# Patient Record
(demographics unavailable — no encounter records)

---

## 2024-10-10 NOTE — HEALTH RISK ASSESSMENT
[de-identified] : neurologist for meningioma [Change in mental status noted] : No change in mental status noted [Language] : denies difficulty with language [Behavior] : denies difficulty with behavior [Learning/Retaining New Information] : denies difficulty learning/retaining new information [Handling Complex Tasks] : denies difficulty handling complex tasks [Reasoning] : denies difficulty with reasoning [Spatial Ability and Orientation] : denies difficulty with spatial ability and orientation [High Risk Behavior] : no high risk behavior [Reports changes in hearing] : Reports no changes in hearing [Reports changes in vision] : Reports no changes in vision [Reports changes in dental health] : Reports no changes in dental health [Guns at Home] : no guns at home [Safety elements used in home] : no safety elements used in home [Travel to Developing Areas] : does not  travel to developing areas [TB Exposure] : is not being exposed to tuberculosis [Caregiver Concerns] : does not have caregiver concerns [ColonoscopyDate] : 2020 [ColonoscopyComments] : normal [de-identified] : optho once yearly [Yes] : Yes [Monthly or less (1 pt)] : Monthly or less (1 point) [1 or 2 (0 pts)] : 1 or 2 (0 points) [Never (0 pts)] : Never (0 points) [No] : In the past 12 months have you used drugs other than those required for medical reasons? No [No falls in past year] : Patient reported no falls in the past year [0] : 2) Feeling down, depressed, or hopeless: Not at all (0) [PHQ-2 Negative - No further assessment needed] : PHQ-2 Negative - No further assessment needed [Patient/Caregiver not ready to engage] : , patient/caregiver not ready to engage [I will adhere to the patient's wishes.] : I will adhere to the patient's wishes. [Time Spent: ___ minutes] : Time Spent: [unfilled] minutes [Audit-CScore] : 1 [de-identified] : Average [de-identified] : Average [facility] [[id number]] : 0 [AdvancecareDate] : 04/22 [Never] : Never

## 2024-10-10 NOTE — HEALTH RISK ASSESSMENT
[de-identified] : neurologist for meningioma [Change in mental status noted] : No change in mental status noted [Language] : denies difficulty with language [Behavior] : denies difficulty with behavior [Learning/Retaining New Information] : denies difficulty learning/retaining new information [Handling Complex Tasks] : denies difficulty handling complex tasks [Reasoning] : denies difficulty with reasoning [Spatial Ability and Orientation] : denies difficulty with spatial ability and orientation [High Risk Behavior] : no high risk behavior [Reports changes in hearing] : Reports no changes in hearing [Reports changes in vision] : Reports no changes in vision [Reports changes in dental health] : Reports no changes in dental health [Guns at Home] : no guns at home [Safety elements used in home] : no safety elements used in home [Travel to Developing Areas] : does not  travel to developing areas [TB Exposure] : is not being exposed to tuberculosis [Caregiver Concerns] : does not have caregiver concerns [ColonoscopyDate] : 2020 [ColonoscopyComments] : normal [de-identified] : optho once yearly [Yes] : Yes [Monthly or less (1 pt)] : Monthly or less (1 point) [1 or 2 (0 pts)] : 1 or 2 (0 points) [Never (0 pts)] : Never (0 points) [No] : In the past 12 months have you used drugs other than those required for medical reasons? No [No falls in past year] : Patient reported no falls in the past year [0] : 2) Feeling down, depressed, or hopeless: Not at all (0) [PHQ-2 Negative - No further assessment needed] : PHQ-2 Negative - No further assessment needed [Patient/Caregiver not ready to engage] : , patient/caregiver not ready to engage [I will adhere to the patient's wishes.] : I will adhere to the patient's wishes. [Time Spent: ___ minutes] : Time Spent: [unfilled] minutes [Audit-CScore] : 1 [de-identified] : Average [de-identified] : Average [facility]] : 9 [[id number]] : 0 [AdvancecareDate] : 04/22 [Never] : Never

## 2024-10-10 NOTE — REVIEW OF SYSTEMS
[Fever] : no fever [Chills] : no chills [Hot Flashes] : no hot flashes [Night Sweats] : no night sweats [Recent Change In Weight] : ~T no recent weight change [Discharge] : no discharge [Pain] : no pain [Itching] : no itching [Earache] : no earache [Hearing Loss] : no hearing loss [Nosebleeds] : no nosebleeds [Palpitations] : no palpitations [Abdominal Pain] : no abdominal pain [Nausea] : no nausea [Diarrhea] : no diarrhea [Vomiting] : no vomiting [Dysuria] : no dysuria [Hematuria] : no hematuria [Itching] : no itching [Skin Rash] : no skin rash [Headache] : no headache [Dizziness] : no dizziness [Fainting] : no fainting [FreeTextEntry9] : upper/middle back pain  [Patient Intake Form Reviewed] : Patient intake form was reviewed [Fatigue] : fatigue [Postnasal Drip] : postnasal drip [Sore Throat] : sore throat [Chest Pain] : chest pain [Cough] : cough [Dyspnea on Exertion] : dyspnea on exertion [Back Pain] : back pain [Insomnia] : insomnia [Negative] : Heme/Lymph [Nasal Discharge] : no nasal discharge [Shortness Of Breath] : no shortness of breath [Wheezing] : no wheezing [FreeTextEntry2] : Overweight [FreeTextEntry4] : Sore throat, postnasal drip [FreeTextEntry5] : HTN, HLD [FreeTextEntry6] : productive cough [FreeTextEntry8] : ED [FreeTextEntry7] : GERD [FreeTextEntry1] : Low Vit. D

## 2024-10-10 NOTE — PHYSICAL EXAM
[de-identified] : B/L crackles heard on auscultation [No Acute Distress] : no acute distress [Well Nourished] : well nourished [Well Developed] : well developed [Well-Appearing] : well-appearing [Normal Sclera/Conjunctiva] : normal sclera/conjunctiva [PERRL] : pupils equal round and reactive to light [EOMI] : extraocular movements intact [Normal Outer Ear/Nose] : the outer ears and nose were normal in appearance [No JVD] : no jugular venous distention [No Lymphadenopathy] : no lymphadenopathy [Supple] : supple [Thyroid Normal, No Nodules] : the thyroid was normal and there were no nodules present [No Respiratory Distress] : no respiratory distress  [No Accessory Muscle Use] : no accessory muscle use [Clear to Auscultation] : lungs were clear to auscultation bilaterally [Normal Rate] : normal rate  [Regular Rhythm] : with a regular rhythm [Normal S1, S2] : normal S1 and S2 [No Murmur] : no murmur heard [No Carotid Bruits] : no carotid bruits [No Abdominal Bruit] : a ~M bruit was not heard ~T in the abdomen [No Varicosities] : no varicosities [Pedal Pulses Present] : the pedal pulses are present [No Edema] : there was no peripheral edema [No Palpable Aorta] : no palpable aorta [No Extremity Clubbing/Cyanosis] : no extremity clubbing/cyanosis [Soft] : abdomen soft [Non Tender] : non-tender [Non-distended] : non-distended [No Masses] : no abdominal mass palpated [No HSM] : no HSM [Normal Bowel Sounds] : normal bowel sounds [Normal Posterior Cervical Nodes] : no posterior cervical lymphadenopathy [Normal Anterior Cervical Nodes] : no anterior cervical lymphadenopathy [No CVA Tenderness] : no CVA  tenderness [No Spinal Tenderness] : no spinal tenderness [No Joint Swelling] : no joint swelling [Grossly Normal Strength/Tone] : grossly normal strength/tone [No Rash] : no rash [Coordination Grossly Intact] : coordination grossly intact [No Focal Deficits] : no focal deficits [Normal Gait] : normal gait [Deep Tendon Reflexes (DTR)] : deep tendon reflexes were 2+ and symmetric [Normal Affect] : the affect was normal [Normal Insight/Judgement] : insight and judgment were intact [de-identified] : Overweight [de-identified] : Erythematous pharynx

## 2024-10-10 NOTE — PLAN
[FreeTextEntry1] : 66 YOM with PMHx of HTN, HLD, hyperglycemia presents for a follow up. no other complaints. follows up with cardiologist   Care plan reviewed Labs drawn Meds reviewed

## 2024-10-10 NOTE — PHYSICAL EXAM
[de-identified] : B/L crackles heard on auscultation [No Acute Distress] : no acute distress [Well Nourished] : well nourished [Well Developed] : well developed [Well-Appearing] : well-appearing [Normal Sclera/Conjunctiva] : normal sclera/conjunctiva [PERRL] : pupils equal round and reactive to light [EOMI] : extraocular movements intact [Normal Outer Ear/Nose] : the outer ears and nose were normal in appearance [No JVD] : no jugular venous distention [No Lymphadenopathy] : no lymphadenopathy [Supple] : supple [Thyroid Normal, No Nodules] : the thyroid was normal and there were no nodules present [No Respiratory Distress] : no respiratory distress  [No Accessory Muscle Use] : no accessory muscle use [Clear to Auscultation] : lungs were clear to auscultation bilaterally [Normal Rate] : normal rate  [Regular Rhythm] : with a regular rhythm [Normal S1, S2] : normal S1 and S2 [No Murmur] : no murmur heard [No Carotid Bruits] : no carotid bruits [No Abdominal Bruit] : a ~M bruit was not heard ~T in the abdomen [No Varicosities] : no varicosities [Pedal Pulses Present] : the pedal pulses are present [No Edema] : there was no peripheral edema [No Palpable Aorta] : no palpable aorta [No Extremity Clubbing/Cyanosis] : no extremity clubbing/cyanosis [Soft] : abdomen soft [Non Tender] : non-tender [Non-distended] : non-distended [No Masses] : no abdominal mass palpated [No HSM] : no HSM [Normal Bowel Sounds] : normal bowel sounds [Normal Posterior Cervical Nodes] : no posterior cervical lymphadenopathy [Normal Anterior Cervical Nodes] : no anterior cervical lymphadenopathy [No CVA Tenderness] : no CVA  tenderness [No Spinal Tenderness] : no spinal tenderness [No Joint Swelling] : no joint swelling [Grossly Normal Strength/Tone] : grossly normal strength/tone [No Rash] : no rash [Coordination Grossly Intact] : coordination grossly intact [No Focal Deficits] : no focal deficits [Normal Gait] : normal gait [Deep Tendon Reflexes (DTR)] : deep tendon reflexes were 2+ and symmetric [Normal Affect] : the affect was normal [Normal Insight/Judgement] : insight and judgment were intact [de-identified] : Overweight [de-identified] : Erythematous pharynx

## 2024-10-10 NOTE — REVIEW OF SYSTEMS
[Fever] : no fever [Chills] : no chills [Hot Flashes] : no hot flashes [Night Sweats] : no night sweats [Recent Change In Weight] : ~T no recent weight change [Discharge] : no discharge [Pain] : no pain [Itching] : no itching [Earache] : no earache [Hearing Loss] : no hearing loss [Nosebleeds] : no nosebleeds [Palpitations] : no palpitations [Abdominal Pain] : no abdominal pain [Nausea] : no nausea [Diarrhea] : no diarrhea [Vomiting] : no vomiting [Dysuria] : no dysuria [Hematuria] : no hematuria [Itching] : no itching [Skin Rash] : no skin rash [Headache] : no headache [Dizziness] : no dizziness [Fainting] : no fainting [FreeTextEntry9] : upper/middle back pain  [Patient Intake Form Reviewed] : Patient intake form was reviewed [Fatigue] : fatigue [Postnasal Drip] : postnasal drip [Sore Throat] : sore throat [Chest Pain] : chest pain [Cough] : cough [Dyspnea on Exertion] : dyspnea on exertion [Back Pain] : back pain [Insomnia] : insomnia [Negative] : Heme/Lymph [Nasal Discharge] : no nasal discharge [Shortness Of Breath] : no shortness of breath [Wheezing] : no wheezing [FreeTextEntry2] : Overweight [FreeTextEntry4] : Sore throat, postnasal drip [FreeTextEntry5] : HTN, HLD [FreeTextEntry6] : productive cough [FreeTextEntry7] : GERD [FreeTextEntry8] : ED [FreeTextEntry1] : Low Vit. D
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
## 2024-11-04 NOTE — ASSESSMENT
[FreeTextEntry1] : Carotid duplex: February 2021: Mild atherosclerosis seen bilaterally without any significant stenosis.  Pharmacological nuclear stress test: February 8, 2021: Normal study  Echocardiogram March 7, 2022: LVEF 50 to 55%.  Mild concentric LVH.  Mild MR.  Mildly dilated left atrium and right atrium.   [facility] 4/19/2022 CORONARY VESSELS: The coronary circulation is left dominant. LM:   --  LM: Normal. LAD:   --  LAD: The vessel was large sized. Angiography showed mild atherosclerosis with no flow limiting lesions. CX:   --  Circumflex: Normal. RCA:   --  RCA: The vessel was small (non-dominant). --  Mid RCA: There was a 30 % stenosis. COMPLICATIONS: No complications occurred during the cath lab visit. DIAGNOSTIC IMPRESSIONS: Non obstructive CAD Mildly elevated left ventricular filling pressure ( LVEDP 17 mmHg) DIAGNOSTIC RECOMMENDATIONS: continue medical therapy for CAD including ASA 81 mg and statin to lower LDL < 70 mg/dl Prepared and signed by Jeremias Lutz MD Signed 04/19/2022 15:55:10    EKG 5/13/2024- Sinus Rhythm  -Nonspecific T-abnormality.  ABNORMAL  EKG 8/5/20224- Sinus Rhythm -With rate variation  cv = 10. Low voltage in precordial leads. -Nonspecific QRS widening.  Echocardiogram September 9, 2024: LVEF 50 to 55%.  Mildly enlarged right ventricle with normal RV systolic function.  No pericardial effusion.  No significant valvular heart disease Holter monitor August 2024: Normal sinus rhythm  rare PACs and PVCs.  No VT no PAF Pharmacological nuclear stress test September 9, 2024: Normal myocardial perfusion. EKG 11/4/2024- Sinus Rhythm -occasional ectopic ventricular beat   -Nonspecific QRS widening.BORDERLINE  Assessment: 1.  CAD -nonobstructive CAD based on cardiac catheterization on April 19, 2022.  Patient is on appropriate GDMT medical therapy  ..will give Rx for SL NTG for PRN use. 2.  Hypertension/hyperlipidemia- On Atorvastatin and Zetia 3.  Bilateral carotid stenosis- No significant stenosis by recent carotid duplex 4. Obesity, back pain and other problems. 5. Palpitations  Recommendations:  1.  Patient advised to continue current medical therapy/SL NTG Rx sent 2.  .  Follow-up in 6  months

## 2024-11-04 NOTE — PHYSICAL EXAM
[Well Developed] : well developed [Well Nourished] : well nourished [Obese] : obese [Normal] : no rash, no skin lesions [de-identified] : Dry oral mucosa

## 2025-04-03 NOTE — HEALTH RISK ASSESSMENT
[Good] : ~his/her~  mood as  good [Monthly or less (1 pt)] : Monthly or less (1 point) [1 or 2 (0 pts)] : 1 or 2 (0 points) [No] : In the past 12 months have you used drugs other than those required for medical reasons? No [No falls in past year] : Patient reported no falls in the past year [0] : 2) Feeling down, depressed, or hopeless: Not at all (0) [PHQ-2 Negative - No further assessment needed] : PHQ-2 Negative - No further assessment needed [Never] : Never [0-4] : 0-4 [Patient declined Low Dose CT Scan] : Patient declined Low Dose CT Scan [Patient declined Retinal Exam] : Patient declined Retinal Exam. [HIV test declined] : HIV test declined [Hepatitis C test declined] : Hepatitis C test declined [None] : None [With Significant Other] : lives with significant other [With Family] : lives with family [# of Members in Household ___] :  household currently consist of [unfilled] member(s) [Retired] : retired [High School] : high school [] :  [# Of Children ___] : has [unfilled] children [Sexually Active] : sexually active [Feels Safe at Home] : Feels safe at home [Fully functional (bathing, dressing, toileting, transferring, walking, feeding)] : Fully functional (bathing, dressing, toileting, transferring, walking, feeding) [Fully functional (using the telephone, shopping, preparing meals, housekeeping, doing laundry, using] : Fully functional and needs no help or supervision to perform IADLs (using the telephone, shopping, preparing meals, housekeeping, doing laundry, using transportation, managing medications and managing finances) [Reports normal functional visual acuity (ie: able to read med bottle)] : Reports normal functional visual acuity [Smoke Detector] : smoke detector [Carbon Monoxide Detector] : carbon monoxide detector [Seat Belt] :  uses seat belt [Sunscreen] : uses sunscreen [Patient/Caregiver not ready to engage] : , patient/caregiver not ready to engage [I will adhere to the patient's wishes.] : I will adhere to the patient's wishes. [Time Spent: ___ minutes] : Time Spent: [unfilled] minutes [Never (0 pts)] : Never (0 points) [Patient reported colonoscopy was normal] : Patient reported colonoscopy was normal [de-identified] : neurologist for meningioma, cardiology, GI [Audit-CScore] : 0 [de-identified] : not very active [de-identified] : not the healthiest [[facility]] : 10 [GYQ5Eakec] : 0 [Change in mental status noted] : No change in mental status noted [Language] : denies difficulty with language [Behavior] : denies difficulty with behavior [Learning/Retaining New Information] : denies difficulty learning/retaining new information [Handling Complex Tasks] : denies difficulty handling complex tasks [Reasoning] : denies difficulty with reasoning [Spatial Ability and Orientation] : denies difficulty with spatial ability and orientation [High Risk Behavior] : no high risk behavior [Reports changes in hearing] : Reports no changes in hearing [Reports changes in vision] : Reports no changes in vision [Reports changes in dental health] : Reports no changes in dental health [Safety elements used in home] : no safety elements used in home [Travel to Developing Areas] : does not  travel to developing areas [TB Exposure] : is not being exposed to tuberculosis [Caregiver Concerns] : does not have caregiver concerns [ColonoscopyDate] : 2024 [ColonoscopyComments] : normal [FreeTextEntry2] : retired  [de-identified] : optho once yearly

## 2025-04-03 NOTE — HISTORY OF PRESENT ILLNESS
[FreeTextEntry1] : 68 y/o male with a PMHx of HTN, HLD, T2DM, ED, CAD, TIA, and anemia presents for a CPE. [de-identified] : 68 y/o male with a PMHx of HTN, HLD, T2DM, ED, CAD, TIA, and anemia presents for a CPE. Pt c/o left ankle discomfort and left plantar foot pain for several months. Pt is requesting a podiatry referral. Denies any injuries, denies numbness and tingling. Pt also c/o chronic intermittent back pain. Pt states that he follows with cardiology, neurology, and gastroenterology. He denies recent illnesses, hospitalizations, or trauma. Pt denies fever, chills, headache, chest pain, SOB, or N/V/D. Pt states he is noncompliant with diet, and pt states he is not very active but just joined the gym and is planning on starting to exercise.

## 2025-04-03 NOTE — COUNSELING
[Fall prevention counseling provided] : Fall prevention counseling provided [Adequate lighting] : Adequate lighting [No throw rugs] : No throw rugs [Use proper foot wear] : Use proper foot wear [Behavioral health counseling provided] : Behavioral health counseling provided [Sleep ___ hours/day] : Sleep [unfilled] hours/day [Engage in a relaxing activity] : Engage in a relaxing activity [AUDIT-C Screening administered and reviewed] : AUDIT-C Screening administered and reviewed [Potential consequences of obesity discussed] : Potential consequences of obesity discussed [Encouraged to increase physical activity] : Encouraged to increase physical activity [None] : None [Good understanding] : Patient has a good understanding of lifestyle changes and steps needed to achieve self management goal [FreeTextEntry2] : Non-smoker

## 2025-04-03 NOTE — PLAN
[FreeTextEntry1] : 68 y/o male with a PMHx of HTN, HLD, T2DM, ED, CAD, TIA, and anemia presents for a CPE. Pt c/o left ankle discomfort and left plantar foot pain for several months. Pt is requesting a podiatry referral. Denies any injuries, denies numbness and tingling. Pt also c/o chronic intermittent back pain. Pt states that he follows with cardiology, neurology, and gastroenterology. He denies recent illnesses, hospitalizations, or trauma. Pt denies fever, chills, headache, chest pain, SOB, or N/V/D. Pt states he is noncompliant with diet, and pt states he is not very active but just joined the gym and is planning on starting to exercise.   Care plan reviewed Labs drawn POCT A1C: 6.9% Pt educated on importance of healthy diet and exercise Pt educated on risks and complications of poor glycemic control. Meds reviewed; Started on Jardiance 10 mg  Podiatry referral EKG reviewed RTC in 6 weeks

## 2025-04-03 NOTE — HISTORY OF PRESENT ILLNESS
[FreeTextEntry1] : 68 y/o male with a PMHx of HTN, HLD, T2DM, ED, CAD, TIA, and anemia presents for a CPE. [de-identified] : 68 y/o male with a PMHx of HTN, HLD, T2DM, ED, CAD, TIA, and anemia presents for a CPE. Pt c/o left ankle discomfort and left plantar foot pain for several months. Pt is requesting a podiatry referral. Denies any injuries, denies numbness and tingling. Pt also c/o chronic intermittent back pain. Pt states that he follows with cardiology, neurology, and gastroenterology. He denies recent illnesses, hospitalizations, or trauma. Pt denies fever, chills, headache, chest pain, SOB, or N/V/D. Pt states he is noncompliant with diet, and pt states he is not very active but just joined the gym and is planning on starting to exercise.

## 2025-04-03 NOTE — PLAN
[FreeTextEntry1] : 70 y/o male with a PMHx of HTN, HLD, T2DM, ED, CAD, TIA, and anemia presents for a CPE. Pt c/o left ankle discomfort and left plantar foot pain for several months. Pt is requesting a podiatry referral. Denies any injuries, denies numbness and tingling. Pt also c/o chronic intermittent back pain. Pt states that he follows with cardiology, neurology, and gastroenterology. He denies recent illnesses, hospitalizations, or trauma. Pt denies fever, chills, headache, chest pain, SOB, or N/V/D. Pt states he is noncompliant with diet, and pt states he is not very active but just joined the gym and is planning on starting to exercise.   Care plan reviewed Labs drawn POCT A1C: 6.9% Pt educated on importance of healthy diet and exercise Pt educated on risks and complications of poor glycemic control. Meds reviewed; Started on Jardiance 10 mg  Podiatry referral EKG reviewed RTC in 6 weeks

## 2025-04-03 NOTE — HEALTH RISK ASSESSMENT
[Good] : ~his/her~  mood as  good [Monthly or less (1 pt)] : Monthly or less (1 point) [1 or 2 (0 pts)] : 1 or 2 (0 points) [No] : In the past 12 months have you used drugs other than those required for medical reasons? No [No falls in past year] : Patient reported no falls in the past year [0] : 2) Feeling down, depressed, or hopeless: Not at all (0) [PHQ-2 Negative - No further assessment needed] : PHQ-2 Negative - No further assessment needed [Never] : Never [0-4] : 0-4 [Patient declined Low Dose CT Scan] : Patient declined Low Dose CT Scan [Patient declined Retinal Exam] : Patient declined Retinal Exam. [HIV test declined] : HIV test declined [Hepatitis C test declined] : Hepatitis C test declined [None] : None [With Significant Other] : lives with significant other [With Family] : lives with family [# of Members in Household ___] :  household currently consist of [unfilled] member(s) [Retired] : retired [High School] : high school [] :  [# Of Children ___] : has [unfilled] children [Sexually Active] : sexually active [Feels Safe at Home] : Feels safe at home [Fully functional (bathing, dressing, toileting, transferring, walking, feeding)] : Fully functional (bathing, dressing, toileting, transferring, walking, feeding) [Fully functional (using the telephone, shopping, preparing meals, housekeeping, doing laundry, using] : Fully functional and needs no help or supervision to perform IADLs (using the telephone, shopping, preparing meals, housekeeping, doing laundry, using transportation, managing medications and managing finances) [Reports normal functional visual acuity (ie: able to read med bottle)] : Reports normal functional visual acuity [Smoke Detector] : smoke detector [Carbon Monoxide Detector] : carbon monoxide detector [Seat Belt] :  uses seat belt [Sunscreen] : uses sunscreen [Patient/Caregiver not ready to engage] : , patient/caregiver not ready to engage [I will adhere to the patient's wishes.] : I will adhere to the patient's wishes. [Time Spent: ___ minutes] : Time Spent: [unfilled] minutes [Never (0 pts)] : Never (0 points) [Patient reported colonoscopy was normal] : Patient reported colonoscopy was normal [de-identified] : neurologist for meningioma, cardiology, GI [Audit-CScore] : 0 [de-identified] : not very active [de-identified] : not the healthiest [[facility]] : 10 [[id number]] : 0 [Change in mental status noted] : No change in mental status noted [Language] : denies difficulty with language [Behavior] : denies difficulty with behavior [Learning/Retaining New Information] : denies difficulty learning/retaining new information [Handling Complex Tasks] : denies difficulty handling complex tasks [Reasoning] : denies difficulty with reasoning [Spatial Ability and Orientation] : denies difficulty with spatial ability and orientation [High Risk Behavior] : no high risk behavior [Reports changes in hearing] : Reports no changes in hearing [Reports changes in vision] : Reports no changes in vision [Reports changes in dental health] : Reports no changes in dental health [Safety elements used in home] : no safety elements used in home [Travel to Developing Areas] : does not  travel to developing areas [TB Exposure] : is not being exposed to tuberculosis [Caregiver Concerns] : does not have caregiver concerns [ColonoscopyDate] : 2024 [ColonoscopyComments] : normal [FreeTextEntry2] : retired  [de-identified] : optho once yearly

## 2025-04-03 NOTE — REVIEW OF SYSTEMS
[Patient Intake Form Reviewed] : Patient intake form was reviewed [Joint Pain] : no joint pain [Joint Stiffness] : no joint stiffness [Muscle Pain] : no muscle pain [Muscle Weakness] : no muscle weakness [Joint Swelling] : no joint swelling [Back Pain] : back pain [Fever] : no fever [Chills] : no chills [Fatigue] : no fatigue [Hot Flashes] : no hot flashes [Night Sweats] : no night sweats [Recent Change In Weight] : ~T no recent weight change [Discharge] : no discharge [Pain] : no pain [Redness] : no redness [Dryness] : no dryness [Vision Problems] : no vision problems [Itching] : no itching [Earache] : no earache [Hearing Loss] : no hearing loss [Nosebleeds] : no nosebleeds [Postnasal Drip] : no postnasal drip [Nasal Discharge] : no nasal discharge [Sore Throat] : no sore throat [Hoarseness] : no hoarseness [Chest Pain] : no chest pain [Palpitations] : no palpitations [Shortness Of Breath] : no shortness of breath [Wheezing] : no wheezing [Cough] : no cough [Dyspnea on Exertion] : not dyspnea on exertion [Abdominal Pain] : no abdominal pain [Nausea] : no nausea [Constipation] : no constipation [Diarrhea] : no diarrhea [Vomiting] : no vomiting [Heartburn] : no heartburn [Melena] : no melena [Dysuria] : no dysuria [Incontinence] : no incontinence [Hesitancy] : no hesitancy [Nocturia] : no nocturia [Hematuria] : no hematuria [Frequency] : no frequency [Mole Changes] : no mole changes [Nail Changes] : no nail changes [Hair Changes] : no hair changes [Skin Rash] : no skin rash [Headache] : no headache [Dizziness] : no dizziness [Fainting] : no fainting [Suicidal] : not suicidal [Insomnia] : no insomnia [Anxiety] : no anxiety [Depression] : no depression [Easy Bleeding] : no easy bleeding [Easy Bruising] : no easy bruising [Swollen Glands] : no swollen glands [FreeTextEntry9] : left ankle and left plantar foot pain

## 2025-05-02 NOTE — CONSULT LETTER
[Dear  ___] : Dear  [unfilled], [Courtesy Letter:] : I had the pleasure of seeing your patient, [unfilled], in my office today. [Please see my note below.] : Please see my note below. [Consult Closing:] : Thank you very much for allowing me to participate in the care of this patient.  If you have any questions, please do not hesitate to contact me. [Sincerely,] : Sincerely, [FreeTextEntry3] : Oscar Nolan M.D., Ph.D. DPN-N [facility] [place] Director, Division of Neurology Director, [facility]

## 2025-05-02 NOTE — PHYSICAL EXAM
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
## 2025-05-02 NOTE — DATA REVIEWED
[de-identified] : His last follow-up MRI was March 21, 2024.  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

## 2025-05-02 NOTE — ASSESSMENT
[FreeTextEntry1] : This is a 69-year-old man with history of meningioma.  He had an MRI a little over a year ago it was stable.  He has no symptoms.  I will check an MRI again to ensure stability.  I will see him back in the office in 1 year but call him with results of the MRI once available.

## 2025-05-12 NOTE — ASSESSMENT
[FreeTextEntry1] : Carotid duplex: February 2021: Mild atherosclerosis seen bilaterally without any significant stenosis.  Pharmacological nuclear stress test: February 8, 2021: Normal study  Echocardiogram March 7, 2022: LVEF 50 to 55%.  Mild concentric LVH.  Mild MR.  Mildly dilated left atrium and right atrium.   [facility] 4/19/2022 CORONARY VESSELS: The coronary circulation is left dominant. LM:   --  LM: Normal. LAD:   --  LAD: The vessel was large sized. Angiography showed mild atherosclerosis with no flow limiting lesions. CX:   --  Circumflex: Normal. RCA:   --  RCA: The vessel was small (non-dominant). --  Mid RCA: There was a 30 % stenosis. COMPLICATIONS: No complications occurred during the cath lab visit. DIAGNOSTIC IMPRESSIONS: Non obstructive CAD Mildly elevated left ventricular filling pressure ( LVEDP 17 mmHg) DIAGNOSTIC RECOMMENDATIONS: continue medical therapy for CAD including ASA 81 mg and statin to lower LDL < 70 mg/dl Prepared and signed by Jeremias Lutz MD Signed 04/19/2022 15:55:10  Echocardiogram September 9, 2024: LVEF 50 to 55%.  Mildly enlarged right ventricle with normal RV systolic function.  No pericardial effusion.  No significant valvular heart disease  Holter monitor August 2024: Normal sinus rhythm  rare PACs and PVCs.  No VT no PAF Pharmacological nuclear stress test September 9, 2024: Normal myocardial perfusion.  EKG 11/4/2024- Sinus Rhythm -occasional ectopic ventricular beat   -Nonspecific QRS widening.BORDERLINE  EKG 5/12/2025- Sinus Rhythm  -Nonspecific QRS widening. Unchanged compared to prior EKG. Assessment: 1.  CAD -nonobstructive CAD based on cardiac catheterization on April 19, 2022.  Patient is on appropriate GDMT medical therapy  ..will give Rx for SL NTG for PRN use. 2.  Hypertension/hyperlipidemia- On Atorvastatin and Zetia 3.  Bilateral carotid stenosis- No significant stenosis by recent carotid duplex 4. Obesity, back pain and other problems. 5. Preop for EGD/Colonoscopy  Recommendations:  1.  Patient advised to continue current medical therapy/SL NTG Rx sent 2.  .  Follow-up in 6  months  Patient is low risk for perioperative cardiac events from EGD and Colonoscopy. NO ABSOLUTE CONTRAINDICATIONS TO PROCEED WITH EGD AND COLONOSCOPY.

## 2025-05-12 NOTE — PHYSICAL EXAM
[Well Developed] : well developed [Well Nourished] : well nourished [Obese] : obese [Normal] : no rash, no skin lesions [de-identified] : Dry oral mucosa
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
## 2025-05-14 NOTE — ASSESSMENT
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 The area to be injected was wiped thoroughly with sterile alcohol. Then using a combination of 1.0 cc of Celestone acetate suspension combined with 1 cc of Dexamethasone sodium phosphate and 1.0 cc of Lidocaine plain, an injection was given at the left suarez neuroma. Sterile gauze was used for compression and then a sterile Band-Aid was applied to the injection site. The possibility of a flair reaction was discussed with the patient prior to administering the injection Discussed likely additional etiology of back given history of multiple back surgeries if not improving advised to follow up with them as well I applied and strapped his foot with metatarsal pads. Instructed on use Can incorporate into orthotic if helps PTR 2-3 weeks if symptomatic

## 2025-05-14 NOTE — PHYSICAL EXAM
[General Appearance - Alert] : alert [General Appearance - In No Acute Distress] : in no acute distress [2+] : left foot dorsalis pedis 2+ [] : normal strength/tone [de-identified] : Full unrestricted nonpainfuil range of motion right ankle no ankle instability noted  left 3rd interspace + nupur click with reproducible tenderness to area.  no pain on range of motion MTPJ no pain submetatarsal 2-5.  [Vibration Dec.] : diminished vibratory sensation at the level of the toes [Oriented To Time, Place, And Person] : oriented to person, place, and time [Impaired Insight] : insight and judgment were intact
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
## 2025-06-11 NOTE — COUNSELING
[Adequate lighting] : Adequate lighting [Fall prevention counseling provided] : Fall prevention counseling provided [Use proper foot wear] : Use proper foot wear [No throw rugs] : No throw rugs [Behavioral health counseling provided] : Behavioral health counseling provided [Sleep ___ hours/day] : Sleep [unfilled] hours/day [Engage in a relaxing activity] : Engage in a relaxing activity [AUDIT-C Screening administered and reviewed] : AUDIT-C Screening administered and reviewed [Potential consequences of obesity discussed] : Potential consequences of obesity discussed [Encouraged to increase physical activity] : Encouraged to increase physical activity [None] : None [Good understanding] : Patient has a good understanding of lifestyle changes and steps needed to achieve self management goal [FreeTextEntry2] : Non-smoker

## 2025-06-11 NOTE — PHYSICAL EXAM
[No Acute Distress] : no acute distress [Well Nourished] : well nourished [Well Developed] : well developed [Well-Appearing] : well-appearing [Normal Sclera/Conjunctiva] : normal sclera/conjunctiva [PERRL] : pupils equal round and reactive to light [EOMI] : extraocular movements intact [Normal Outer Ear/Nose] : the outer ears and nose were normal in appearance [Normal Oropharynx] : the oropharynx was normal [No JVD] : no jugular venous distention [No Lymphadenopathy] : no lymphadenopathy [Supple] : supple [Thyroid Normal, No Nodules] : the thyroid was normal and there were no nodules present [No Respiratory Distress] : no respiratory distress  [No Accessory Muscle Use] : no accessory muscle use [Clear to Auscultation] : lungs were clear to auscultation bilaterally [Regular Rhythm] : with a regular rhythm [Normal Rate] : normal rate  [Normal S1, S2] : normal S1 and S2 [No Murmur] : no murmur heard [No Abdominal Bruit] : a ~M bruit was not heard ~T in the abdomen [No Carotid Bruits] : no carotid bruits [No Varicosities] : no varicosities [Pedal Pulses Present] : the pedal pulses are present [No Edema] : there was no peripheral edema [No Palpable Aorta] : no palpable aorta [No Extremity Clubbing/Cyanosis] : no extremity clubbing/cyanosis [Soft] : abdomen soft [Non Tender] : non-tender [Non-distended] : non-distended [No Masses] : no abdominal mass palpated [No HSM] : no HSM [Normal Bowel Sounds] : normal bowel sounds [Normal Posterior Cervical Nodes] : no posterior cervical lymphadenopathy [Normal Anterior Cervical Nodes] : no anterior cervical lymphadenopathy [No CVA Tenderness] : no CVA  tenderness [No Spinal Tenderness] : no spinal tenderness [No Joint Swelling] : no joint swelling [Grossly Normal Strength/Tone] : grossly normal strength/tone [No Rash] : no rash [Coordination Grossly Intact] : coordination grossly intact [No Focal Deficits] : no focal deficits [Normal Gait] : normal gait [Deep Tendon Reflexes (DTR)] : deep tendon reflexes were 2+ and symmetric [Normal Affect] : the affect was normal [Normal] : affect was normal and insight and judgment were intact [Normal Insight/Judgement] : insight and judgment were intact

## 2025-06-11 NOTE — HEALTH RISK ASSESSMENT
[Good] : ~his/her~  mood as  good [1 or 2 (0 pts)] : 1 or 2 (0 points) [No] : In the past 12 months have you used drugs other than those required for medical reasons? No [Never (0 pts)] : Never (0 points) [No falls in past year] : Patient reported no falls in the past year [PHQ-2 Negative - No further assessment needed] : PHQ-2 Negative - No further assessment needed [0] : 2) Feeling down, depressed, or hopeless: Not at all (0) [Patient/Caregiver not ready to engage] : , patient/caregiver not ready to engage [I will adhere to the patient's wishes.] : I will adhere to the patient's wishes. [Time Spent: ___ minutes] : Time Spent: [unfilled] minutes [Never] : Never [Patient declined Low Dose CT Scan] : Patient declined Low Dose CT Scan [Patient declined Retinal Exam] : Patient declined Retinal Exam. [Patient reported colonoscopy was normal] : Patient reported colonoscopy was normal [HIV test declined] : HIV test declined [Hepatitis C test declined] : Hepatitis C test declined [None] : None [With Significant Other] : lives with significant other [With Family] : lives with family [# of Members in Household ___] :  household currently consist of [unfilled] member(s) [Retired] : retired [High School] : high school [] :  [Sexually Active] : sexually active [# Of Children ___] : has [unfilled] children [Fully functional (bathing, dressing, toileting, transferring, walking, feeding)] : Fully functional (bathing, dressing, toileting, transferring, walking, feeding) [Feels Safe at Home] : Feels safe at home [Fully functional (using the telephone, shopping, preparing meals, housekeeping, doing laundry, using] : Fully functional and needs no help or supervision to perform IADLs (using the telephone, shopping, preparing meals, housekeeping, doing laundry, using transportation, managing medications and managing finances) [Reports normal functional visual acuity (ie: able to read med bottle)] : Reports normal functional visual acuity [Carbon Monoxide Detector] : carbon monoxide detector [Smoke Detector] : smoke detector [Seat Belt] :  uses seat belt [Sunscreen] : uses sunscreen [de-identified] : neurologist for meningioma, cardiology, GI [Audit-CScore] : 0 [de-identified] : not the healthiest [de-identified] : not very active [[facility]] : 10 [[id number]] : 0 [Change in mental status noted] : No change in mental status noted [Language] : denies difficulty with language [Behavior] : denies difficulty with behavior [Learning/Retaining New Information] : denies difficulty learning/retaining new information [Handling Complex Tasks] : denies difficulty handling complex tasks [Reasoning] : denies difficulty with reasoning [Spatial Ability and Orientation] : denies difficulty with spatial ability and orientation [High Risk Behavior] : no high risk behavior [Reports changes in hearing] : Reports no changes in hearing [Reports changes in vision] : Reports no changes in vision [Reports changes in dental health] : Reports no changes in dental health [Safety elements used in home] : no safety elements used in home [Travel to Developing Areas] : does not  travel to developing areas [TB Exposure] : is not being exposed to tuberculosis [Caregiver Concerns] : does not have caregiver concerns [ColonoscopyDate] : 2024 [ColonoscopyComments] : normal [FreeTextEntry2] : retired  [de-identified] : optho once yearly

## 2025-06-11 NOTE — HISTORY OF PRESENT ILLNESS
[FreeTextEntry1] : 68 y/o male with a PMHx of HTN, HLD, T2DM, ED, CAD, TIA, and anemia presents for 6 week follow up. [de-identified] : 70 y/o male with a PMHx of HTN, HLD, T2DM, ED, CAD, TIA, and anemia presents for 6 week follow up. Pt with recent visit to office for left ankle discomfort and left plantar foot pain and followed up with podiatry. Xray showed no evidence of fracture/ dislocation/ acute injury. 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 Pt given a steroid shot and given metatarsal pads. Pt also with complaints of upset stomach and diarrhea starting x3 days ago. Pt states abd pain improves once he has a BM. Pt believes he has food poison or stomach bug. Pt has been keeping himself hydrated. Pt was started on Jardiance but due to cost has not been taking it. Pt has been sticking to metformin and remains compliant. Pt states that he follows with cardiology, neurology, and gastroenterology. He denies recent illnesses, hospitalizations, or trauma. Pt denies fever, chills, headache, chest pain, SOB, or N/V/D. Pt states he is noncompliant with diet, and pt states he is not very active but just joined the gym and has gone once since last visit.

## 2025-06-11 NOTE — PLAN
Concurrent stay review; Secondary Review Determination     [facility]          Under the authority of the Utilization Management Committee, the utilization review process indicated a secondary review on the above patient.  The review outcome is based on review of the medical records, discussions with staff, and applying clinical experience noted on the date of the review.          (x) Observation Status Appropriate - Concurrent stay review    RATIONALE FOR DETERMINATION   68 year old male with history of hypertension, diabetes mellitus on insulin, obesity, CKD 3, HUBER on CPAP who presents with cellulitis of LLE and admitted on 8/26/2021   there is no evidence of sepsis or severe systemic inflammatory response per documentation this is partially treated cellulitis with anticipated discharge tomorrow if continues to improve.  Second night stay does not meet criteria for advancing to inpatient.  The severity of illness, intensity of service provided, expected LOS and risk for adverse outcome make the care appropriate for observation.      This document was produced using voice recognition software       The information on this document is developed by the utilization review team in order for the business office to ensure compliance.  This only denotes the appropriateness of proper admission status and does not reflect the quality of care rendered.         The definitions of Inpatient Status and Observation Status used in making the determination above are those provided in the CMS Coverage Manual, Chapter 1 and Chapter 6, section 70.4.      Sincerely,     DAPHNIE TREJO MD    System Medical Director  Utilization Management  [facility].           [FreeTextEntry1] : 70 y/o male with a PMHx of HTN, HLD, T2DM, ED, CAD, TIA, and anemia presents for 6 week follow up. Pt with recent visit to office for left ankle discomfort and left plantar foot pain and followed up with podiatry. Xray showed no evidence of fracture/ dislocation/ acute injury. 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 Pt given a steroid shot and given metatarsal pads. Pt also with complaints of upset stomach and diarrhea starting x3 days ago. Pt states abd pain improves once he has a BM. Pt believes he has food poison or stomach bug. Pt has been keeping himself hydrated. Pt was started on Jardiance but due to cost has not been taking it. Pt has been sticking to metformin and remains compliant. Pt states that he follows with cardiology, neurology, and gastroenterology. He denies recent illnesses, hospitalizations, or trauma. Pt denies fever, chills, headache, chest pain, SOB, or N/V/D. Pt states he is noncompliant with diet, and pt states he is not very active but just joined the gym and has gone once since last visit.   Care plan reviewed Labs drawn, nasal swab ordered POCT A1C: 6.9% Pt educated on importance of healthy diet and exercise, risks and complications of poor glycemic control. Meds reviewed; ordered dapagliflozin 10mg daily RTC in 3 months

## 2025-06-11 NOTE — REVIEW OF SYSTEMS
[Patient Intake Form Reviewed] : Patient intake form was reviewed [Back Pain] : back pain [Diarrhea] : diarrhea [Fever] : no fever [Chills] : no chills [Fatigue] : no fatigue [Hot Flashes] : no hot flashes [Night Sweats] : no night sweats [Recent Change In Weight] : ~T no recent weight change [Discharge] : no discharge [Pain] : no pain [Redness] : no redness [Dryness] : no dryness [Vision Problems] : no vision problems [Itching] : no itching [Earache] : no earache [Hearing Loss] : no hearing loss [Nosebleeds] : no nosebleeds [Postnasal Drip] : no postnasal drip [Nasal Discharge] : no nasal discharge [Sore Throat] : no sore throat [Hoarseness] : no hoarseness [Chest Pain] : no chest pain [Palpitations] : no palpitations [Shortness Of Breath] : no shortness of breath [Wheezing] : no wheezing [Cough] : no cough [Dyspnea on Exertion] : not dyspnea on exertion [Abdominal Pain] : no abdominal pain [Nausea] : no nausea [Constipation] : no constipation [Vomiting] : no vomiting [Heartburn] : no heartburn [Dysuria] : no dysuria [Melena] : no melena [Incontinence] : no incontinence [Hesitancy] : no hesitancy [Nocturia] : no nocturia [Hematuria] : no hematuria [Frequency] : no frequency [Itching] : no itching [Mole Changes] : no mole changes [Nail Changes] : no nail changes [Hair Changes] : no hair changes [Skin Rash] : no skin rash [Headache] : no headache [Dizziness] : no dizziness [Fainting] : no fainting [Suicidal] : not suicidal [Insomnia] : no insomnia [Anxiety] : no anxiety [Depression] : no depression [Easy Bleeding] : no easy bleeding [Easy Bruising] : no easy bruising [Swollen Glands] : no swollen glands [FreeTextEntry7] : abd pain [FreeTextEntry9] : left ankle and left plantar foot pain

## 2025-07-30 NOTE — HISTORY OF PRESENT ILLNESS
[FreeTextEntry1] : The patient has a prior history of heartburn due to gastroesophageal reflux for many years and underwent an upper endoscopy by Dr. Barnes in May 2019 that revealed some mild esophagitis but the actual description of what was seen is vague in the report.  He was placed on pantoprazole 40 mg p.o. every morning and sometime thereafter he was switched to omeprazole 40 mg p.o. every morning on which he has continued.  He also gave remote history of having had colon polyps.  In April of last year he underwent colonoscopy by me that revealed mild diverticulosis of the sigmoid colon and a 5 mm polyp in the proximal transverse colon that was removed with cold snare polypectomy and was a tubular adenoma.  There were no other abnormalities and I recommended a follow-up exam in 5 years.  He was also complaining of some spitting up of blood and in July of last year he underwent an upper endoscopy that revealed some erythema in the mid esophagus the biopsies of which were unremarkable.  The stomach and duodenum were normal with no H. pylori organisms or celiac disease.  He returns today noting that for the past 2 months he has had a change in his bowel habits accompanied by increased gas and bloating as well as recurrent lower abdominal cramping that can be quite intense and usually resolves after a bowel movement.  He is averaging anywhere from 3-5 bowel movements daily that can be anywhere from hard to soft to loose.  There is no recent antibiotic use.  There is no rectal bleeding.  There is no nausea or vomiting.  He denies any fever.

## 2025-07-30 NOTE — REASON FOR VISIT
[Follow-up] : a follow-up of an existing diagnosis [FreeTextEntry1] : Prior colon polyps, hard stools and reflux esophagitis.

## 2025-07-30 NOTE — ASSESSMENT
[FreeTextEntry1] : The patient is altered bowel habits with lower abdominal cramping may simply be due to an exacerbation of his underlying constipation and the loose more frequent stools may be due to overflow diarrhea.  An infectious process should be excluded and we will submit stools for culture, GI PCR and C. difficile toxin.  He will also undergo a CAT scan of the abdomen and pelvis with IV contrast to rule out diverticulitis.  He will obtain a repeat CBC as well as BMP, liver function tests as well as amylase and lipase.  If there is no diverticulitis or infectious process I have recommended that he start Benefiber 1 heaping tablespoon daily.  Further recommendations will depend upon his subsequent course and the results of the above.  He will continue to take omeprazole daily.

## 2025-07-30 NOTE — PHYSICAL EXAM
